# Patient Record
Sex: FEMALE | Race: WHITE | NOT HISPANIC OR LATINO | Employment: OTHER | URBAN - METROPOLITAN AREA
[De-identification: names, ages, dates, MRNs, and addresses within clinical notes are randomized per-mention and may not be internally consistent; named-entity substitution may affect disease eponyms.]

---

## 2018-05-24 ENCOUNTER — VIRTUAL VISIT (OUTPATIENT)
Dept: FAMILY MEDICINE | Facility: OTHER | Age: 38
End: 2018-05-24

## 2018-05-24 NOTE — PROGRESS NOTES
"Date:   Clinician: Sheri Dumas  Clinician NPI: [id number]  Patient: Zahra Walker  Patient : 1980  Patient Address: [facility] [place] [address]  Patient Phone: [phone]  Visit Protocol: UTI  Patient Summary:  Zahra is a 37 year old ( : 1980 ) female who initiated a Visit for a presumed bladder infection. When asked the question \"Please sign me up to receive news, health information and promotions. \", Zahra responded \"No\".    Her symptoms began yesterday and consist of urinary frequency, dysuria, and urgency.   Symptom Details   Urinary Frequency: Several times each hour    She denies flank pain, loss of appetite, feeling feverish, recent antibiotic use, chills, foul smelling urine, nausea, hesitation, hematuria, urinary incontinence, vaginal discharge, abdominal pain, and vomiting. Zahra has never had kidney stones. She has not been hospitalized, been a patient in a nursing home, or had a catheter in the past two weeks. She denies risk factors for sexually transmitted infections.   Zahra has not had any UTIs in the past 12 months. Her current symptoms are similar to the previous UTI symptoms. She took nitrofurantoin for her last infection and found it to NOT be effective. Antibiotics were not effective for her last UTI.   Zahra does not get yeast infections when she takes antibiotics.   She denies pregnancy and denies breastfeeding. She has menstruated in the past month.   Additional information as reported by the patient (free text): I do not wish to have macrobid please, cipro or bactrum work  MEDICATIONS: [{\"id\"=&gt;[id number], \"description\"=&gt;\"Lialda oral\"}], ALLERGIES: []  Clinician Response:  Dear Zahra,  Based on the information you have provided, you likely have a bladder infection, also called acute urinary tract infection (UTI).   To treat your infection, I am prescribing:   Sulfamethoxazole-trimethoprim (Bactrim DS) 800-160 mg oral tablet. Take " 1 tablet by mouth every 12 hours for 3 days. Continue taking the tablets even if you feel better before all of the medication is gone. There are no refills with this prescription.  Antibiotic selections by the provider are based on safety and effectiveness. You may or may not be prescribed the same medication that you took for your last bladder infection.   Some women may develop a yeast infection as a side effect of taking antibiotics. If you notice symptoms of a yeast infection, [facility] can help treat that condition as well. Simply log in and complete another Visit, which will cover all of the necessary questions to determine the best treatment for you.   Some people develop allergies to antibiotics. If you notice a new rash, significant swelling, or difficulty breathing, stop the medication immediately and go into a clinic for physical evaluation.   If you become pregnant during this course of treatment, stop taking the medication and contact your primary care provider.   Unless you are allergic to the following over-the-counter medication, I recommend:  phenazopyridine (AZO, Uristat, or store brand) oral tablet to treat your discomfort with urination. Swallow two (2) tablets three times a day for up to 2 days. Take the pills with a full glass of water after a meal.  You will notice that this medication adds an orange/red color to your urine, which may stain fabric. Your contact lenses may also stain if handled after touching the tablets. If your skin or the whites of your eyes develop a yellowish color, it may indicate that your kidneys are not correctly removing the medication. Although this is uncommon, stop using the medication and immediately contact your clinic if this happens.  This is an over-the-counter medication that does not require a prescription.   To help treat your current UTI and prevent future occurrences, remember to:     Drink 8-10, 8-ounce glasses of water daily.    Urinate after sexual  intercourse.    Wipe front to back after using the bathroom.     You should visit a clinic for a follow-up visit if your symptoms do not improve in 1-2 days or if you experience another urinary tract infection soon after completing this treatment.   Diagnosis: Acute uncomplicated bladder infection  Diagnosis ICD: N39.0  Prescription: sulfamethoxazole-trimethoprim (Bactrim DS) 800-160 mg oral tablet 6 tablet, 3 days supply. Take 1 tablet by mouth every 12 hours for 3 days. Refills: 0, Refill as needed: no, Allow substitutions: yes  Pharmacy: [id number] - [phone] - [id number] [place] [address]

## 2021-06-05 ENCOUNTER — RECORDS - HEALTHEAST (OUTPATIENT)
Dept: SCHEDULING | Facility: CLINIC | Age: 41
End: 2021-06-05

## 2021-06-05 DIAGNOSIS — K50.80 REGIONAL ENTERITIS OF SMALL INTESTINE WITH LARGE INTESTINE (H): ICD-10-CM

## 2022-08-11 ENCOUNTER — HOSPITAL ENCOUNTER (OUTPATIENT)
Facility: CLINIC | Age: 42
End: 2022-08-11
Payer: COMMERCIAL